# Patient Record
Sex: FEMALE | Race: WHITE | NOT HISPANIC OR LATINO | URBAN - METROPOLITAN AREA
[De-identification: names, ages, dates, MRNs, and addresses within clinical notes are randomized per-mention and may not be internally consistent; named-entity substitution may affect disease eponyms.]

---

## 2017-09-13 ENCOUNTER — OFFICE VISIT - HEALTHEAST (OUTPATIENT)
Dept: FAMILY MEDICINE | Facility: CLINIC | Age: 62
End: 2017-09-13

## 2017-09-13 DIAGNOSIS — M54.50 LOW BACK PAIN: ICD-10-CM

## 2017-09-13 RX ORDER — HYDROCODONE BITARTRATE AND ACETAMINOPHEN 5; 325 MG/1; MG/1
1 TABLET ORAL EVERY 6 HOURS PRN
Qty: 20 TABLET | Refills: 0 | Status: SHIPPED | OUTPATIENT
Start: 2017-09-13

## 2021-05-31 VITALS — WEIGHT: 125 LBS

## 2021-06-13 NOTE — PROGRESS NOTES
Assessment/Plan:   Low back pain  Acute injury following a lift, twist and reach maneuver at work.  Pain very low back just to the right of midline.  Minimal muscle spasm.  Back extension and bend to the right aggravate pain the most as does any movement of the right leg.  No radicular pain into the right leg at this time.  Suspect possible disc protrusion or strain.  Xray: Mild intervertebral disc height loss at L5-S1. Mild facet arthropathy at L4-L5 and L5-S1. Mild degenerative changes of sacroiliac joints.    - HYDROcodone-acetaminophen 5-325 mg per tablet; Take 1 tablet by mouth every 6 (six) hours as needed for pain.  Dispense: 20 tablet; Refill: 0  - XR Lumbar Spine 2 or 3 VWS    Ice to low back every couple hours for couple days then as needed  Ibuprofen 800mg with food every 8 hours  May use tylenol 500-1000mg every 6 hours if needed in between  Vicodin 1-2 every 6 hours if needed for severe pain, makes you sleepy, watch total tylenol dosing.   She declined trial of steroid or muscle relaxant at this time and will see if things improve with this management first.    Recheck with primary care in 2 days for further management and evaluation.    Note given to be off work for 2 more days pending further evaluation and rest.      Subjective:      Tere Cotter is a 62 y.o. female who presents with pain in her low back right side.  On Monday she was at work ([employer]) lifting boxes onto shelves.  At one point she lifted the box and twisted to place it farther over and felt a twinge in her back and then the box started to fall so she had lean further and reach to catch it and put it back on shelf.  That evening she didn't feel too bad, pain relieved with ibuprofen.  The next morning she had much more pain around the sacrum.  Right leg movements increase the pain.  She has been taking it easy and using a heating pad. She is taking ibuprofen 800mg every 6 hours. The pain is worse today and she had trouble walking  and moving.  She was sent by work to be checked out.  Her primary clinic is [facility] and apparently they couldn't get her in so her work comp  told her to come to [facility] walk in clinic [place] or [place] for work comp evaluation.  No pain in the right leg thought moving the right leg makes her back hurt more. No numbness or tingling.  Hard to sleep last night.  Not too much muscle spasm.  No prior back problems.  No change in bowel or bladder function.  No abdominal pain.  No routine medications,  Generally healthy.  NKDA.          Objective:     /60 (Patient Site: Right Arm, Patient Position: Sitting, Cuff Size: Adult Regular)  Pulse 72  Temp 98.2  F (36.8  C) (Oral)   Resp 18  Wt 125 lb (56.7 kg)  SpO2 98%  Breastfeeding? No    Physical  General Appearance: Alert, cooperative, no distress, moves gingerly.   Head: Normocephalic, without obvious abnormality, atraumatic  Eyes: Conjunctivae are normal.  Extremities: No lower extremity edema.  Normal sensation and DTRs.  Right SLR increases pain in low back, less so with left SLR.  Strength intact.  Tender just to the right of midline lower lumbar but above the SI joint. No pain with percussion of the spine or CVA region.  No significant muscles spasm.  Increased pain with leaning back extension, less so with flexion.  Pain worse with right side bend, less with left side bend.   Skin: no rashes or lesions  Psychiatric: Patient has a normal mood and affect.      Xray lumbar spine obtained and viewed by me.  Some Degenerative changes and possible narrowing of LF-S1 space. Loss of lordotic curve and a mild curve present.